# Patient Record
Sex: FEMALE | Race: WHITE | NOT HISPANIC OR LATINO | Employment: FULL TIME | URBAN - METROPOLITAN AREA
[De-identification: names, ages, dates, MRNs, and addresses within clinical notes are randomized per-mention and may not be internally consistent; named-entity substitution may affect disease eponyms.]

---

## 2022-12-27 ENCOUNTER — OFFICE VISIT (OUTPATIENT)
Dept: INTERNAL MEDICINE | Facility: CLINIC | Age: 37
End: 2022-12-27
Payer: COMMERCIAL

## 2022-12-27 VITALS
HEIGHT: 63 IN | SYSTOLIC BLOOD PRESSURE: 118 MMHG | RESPIRATION RATE: 20 BRPM | TEMPERATURE: 98.2 F | BODY MASS INDEX: 25.71 KG/M2 | DIASTOLIC BLOOD PRESSURE: 86 MMHG | HEART RATE: 74 BPM | WEIGHT: 145.1 LBS | OXYGEN SATURATION: 99 %

## 2022-12-27 DIAGNOSIS — N92.0 MENORRHAGIA WITH REGULAR CYCLE: ICD-10-CM

## 2022-12-27 DIAGNOSIS — K21.00 GASTROESOPHAGEAL REFLUX DISEASE WITH ESOPHAGITIS WITHOUT HEMORRHAGE: ICD-10-CM

## 2022-12-27 DIAGNOSIS — Z23 INFLUENZA VACCINATION ADMINISTERED AT CURRENT VISIT: ICD-10-CM

## 2022-12-27 DIAGNOSIS — Z13.220 SCREENING FOR HYPERLIPIDEMIA: ICD-10-CM

## 2022-12-27 DIAGNOSIS — E55.9 VITAMIN D DEFICIENCY: ICD-10-CM

## 2022-12-27 DIAGNOSIS — Z00.00 ROUTINE GENERAL MEDICAL EXAMINATION AT A HEALTH CARE FACILITY: Primary | ICD-10-CM

## 2022-12-27 DIAGNOSIS — Z23 COVID-19 VACCINE ADMINISTERED: ICD-10-CM

## 2022-12-27 LAB
ALBUMIN SERPL BCG-MCNC: 4.2 G/DL (ref 3.5–5.2)
ALP SERPL-CCNC: 109 U/L (ref 35–104)
ALT SERPL W P-5'-P-CCNC: 12 U/L (ref 10–35)
ANION GAP SERPL CALCULATED.3IONS-SCNC: 12 MMOL/L (ref 7–15)
AST SERPL W P-5'-P-CCNC: 28 U/L (ref 10–35)
BASOPHILS # BLD AUTO: 0 10E3/UL (ref 0–0.2)
BASOPHILS NFR BLD AUTO: 0 %
BILIRUB SERPL-MCNC: 1.2 MG/DL
BUN SERPL-MCNC: 9.9 MG/DL (ref 6–20)
CALCIUM SERPL-MCNC: 9.7 MG/DL (ref 8.6–10)
CHLORIDE SERPL-SCNC: 105 MMOL/L (ref 98–107)
CHOLEST SERPL-MCNC: 173 MG/DL
CREAT SERPL-MCNC: 0.68 MG/DL (ref 0.51–0.95)
DEPRECATED HCO3 PLAS-SCNC: 23 MMOL/L (ref 22–29)
EOSINOPHIL # BLD AUTO: 0.1 10E3/UL (ref 0–0.7)
EOSINOPHIL NFR BLD AUTO: 1 %
ERYTHROCYTE [DISTWIDTH] IN BLOOD BY AUTOMATED COUNT: 12.6 % (ref 10–15)
FERRITIN SERPL-MCNC: 44 NG/ML (ref 6–175)
GFR SERPL CREATININE-BSD FRML MDRD: >90 ML/MIN/1.73M2
GLUCOSE SERPL-MCNC: 89 MG/DL (ref 70–99)
HCT VFR BLD AUTO: 39.1 % (ref 35–47)
HDLC SERPL-MCNC: 55 MG/DL
HGB BLD-MCNC: 13.5 G/DL (ref 11.7–15.7)
IMM GRANULOCYTES # BLD: 0 10E3/UL
IMM GRANULOCYTES NFR BLD: 0 %
IRON BINDING CAPACITY (ROCHE): 314 UG/DL (ref 240–430)
IRON SATN MFR SERPL: 35 % (ref 15–46)
IRON SERPL-MCNC: 109 UG/DL (ref 37–145)
LDLC SERPL CALC-MCNC: 101 MG/DL
LYMPHOCYTES # BLD AUTO: 1.8 10E3/UL (ref 0.8–5.3)
LYMPHOCYTES NFR BLD AUTO: 26 %
MCH RBC QN AUTO: 29.7 PG (ref 26.5–33)
MCHC RBC AUTO-ENTMCNC: 34.5 G/DL (ref 31.5–36.5)
MCV RBC AUTO: 86 FL (ref 78–100)
MONOCYTES # BLD AUTO: 0.6 10E3/UL (ref 0–1.3)
MONOCYTES NFR BLD AUTO: 8 %
NEUTROPHILS # BLD AUTO: 4.4 10E3/UL (ref 1.6–8.3)
NEUTROPHILS NFR BLD AUTO: 65 %
NONHDLC SERPL-MCNC: 118 MG/DL
PLATELET # BLD AUTO: 226 10E3/UL (ref 150–450)
POTASSIUM SERPL-SCNC: 3.7 MMOL/L (ref 3.4–5.3)
PROT SERPL-MCNC: 7.4 G/DL (ref 6.4–8.3)
RBC # BLD AUTO: 4.55 10E6/UL (ref 3.8–5.2)
SODIUM SERPL-SCNC: 140 MMOL/L (ref 136–145)
TRIGL SERPL-MCNC: 86 MG/DL
WBC # BLD AUTO: 6.9 10E3/UL (ref 4–11)

## 2022-12-27 PROCEDURE — 80061 LIPID PANEL: CPT | Performed by: INTERNAL MEDICINE

## 2022-12-27 PROCEDURE — 90471 IMMUNIZATION ADMIN: CPT | Performed by: INTERNAL MEDICINE

## 2022-12-27 PROCEDURE — 85025 COMPLETE CBC W/AUTO DIFF WBC: CPT | Performed by: INTERNAL MEDICINE

## 2022-12-27 PROCEDURE — 99385 PREV VISIT NEW AGE 18-39: CPT | Mod: 25 | Performed by: INTERNAL MEDICINE

## 2022-12-27 PROCEDURE — 90686 IIV4 VACC NO PRSV 0.5 ML IM: CPT | Performed by: INTERNAL MEDICINE

## 2022-12-27 PROCEDURE — 82728 ASSAY OF FERRITIN: CPT | Performed by: INTERNAL MEDICINE

## 2022-12-27 PROCEDURE — 91312 COVID-19 VACCINE BIVALENT BOOSTER 12+ (PFIZER): CPT | Performed by: INTERNAL MEDICINE

## 2022-12-27 PROCEDURE — 83550 IRON BINDING TEST: CPT | Performed by: INTERNAL MEDICINE

## 2022-12-27 PROCEDURE — 82306 VITAMIN D 25 HYDROXY: CPT | Performed by: INTERNAL MEDICINE

## 2022-12-27 PROCEDURE — 99213 OFFICE O/P EST LOW 20 MIN: CPT | Mod: 25 | Performed by: INTERNAL MEDICINE

## 2022-12-27 PROCEDURE — 80053 COMPREHEN METABOLIC PANEL: CPT | Performed by: INTERNAL MEDICINE

## 2022-12-27 PROCEDURE — 0124A COVID-19 VACCINE BIVALENT BOOSTER 12+ (PFIZER): CPT | Performed by: INTERNAL MEDICINE

## 2022-12-27 PROCEDURE — 36415 COLL VENOUS BLD VENIPUNCTURE: CPT | Performed by: INTERNAL MEDICINE

## 2022-12-27 PROCEDURE — 83540 ASSAY OF IRON: CPT | Performed by: INTERNAL MEDICINE

## 2022-12-27 RX ORDER — FAMOTIDINE 20 MG/1
20 TABLET, FILM COATED ORAL DAILY PRN
Qty: 30 TABLET | Refills: 11 | Status: SHIPPED | OUTPATIENT
Start: 2022-12-27

## 2022-12-27 ASSESSMENT — PAIN SCALES - GENERAL: PAINLEVEL: NO PAIN (0)

## 2022-12-27 NOTE — LETTER
January 5, 2023      [employer]  [address] [facility] [address]        Dear ,    We are writing to inform you of your test results.    Your vitamin D is slightly low. I recommend taking a supplement of vitamin D3 5000 international units daily, you can buy this over the counter at any pharmacy.       Your cholesterol levels look good. Your kidney function, electrolytes and liver function are all normal. The very slight elevation in alkaline phosphatase is nothing to worry about, we will recheck this the next time I see you just to monitor.       Your iron levels and blood counts are normal. So your heavy periods have not made you anemic, which is good news.       Resulted Orders   Comprehensive metabolic panel   Result Value Ref Range    Sodium 140 136 - 145 mmol/L    Potassium 3.7 3.4 - 5.3 mmol/L    Chloride 105 98 - 107 mmol/L    Carbon Dioxide (CO2) 23 22 - 29 mmol/L    Anion Gap 12 7 - 15 mmol/L    Urea Nitrogen 9.9 6.0 - 20.0 mg/dL    Creatinine 0.68 0.51 - 0.95 mg/dL    Calcium 9.7 8.6 - 10.0 mg/dL    Glucose 89 70 - 99 mg/dL    Alkaline Phosphatase 109 (H) 35 - 104 U/L    AST 28 10 - 35 U/L    ALT 12 10 - 35 U/L    Protein Total 7.4 6.4 - 8.3 g/dL    Albumin 4.2 3.5 - 5.2 g/dL    Bilirubin Total 1.2 <=1.2 mg/dL    GFR Estimate >90 >60 mL/min/1.73m2      Comment:      Effective December 21, 2021 eGFRcr in adults is calculated using the 2021 CKD-EPI creatinine equation which includes age and gender (Karen et al., NEJ, DOI: 10.1056/NEUPua7341258)   Iron and iron binding capacity   Result Value Ref Range    Iron 109 37 - 145 ug/dL    Iron Binding Capacity 314 240 - 430 ug/dL    Iron Sat Index 35 15 - 46 %   Ferritin   Result Value Ref Range    Ferritin 44 6 - 175 ng/mL   CBC with platelets and differential   Result Value Ref Range    WBC Count 6.9 4.0 - 11.0 10e3/uL    RBC Count 4.55 3.80 - 5.20 10e6/uL    Hemoglobin 13.5 11.7 - 15.7 g/dL    Hematocrit 39.1 35.0 - 47.0 %    MCV 86 78 - 100 fL     MCH 29.7 26.5 - 33.0 pg    MCHC 34.5 31.5 - 36.5 g/dL    RDW 12.6 10.0 - 15.0 %    Platelet Count 226 150 - 450 10e3/uL    % Neutrophils 65 %    % Lymphocytes 26 %    % Monocytes 8 %    % Eosinophils 1 %    % Basophils 0 %    % Immature Granulocytes 0 %    Absolute Neutrophils 4.4 1.6 - 8.3 10e3/uL    Absolute Lymphocytes 1.8 0.8 - 5.3 10e3/uL    Absolute Monocytes 0.6 0.0 - 1.3 10e3/uL    Absolute Eosinophils 0.1 0.0 - 0.7 10e3/uL    Absolute Basophils 0.0 0.0 - 0.2 10e3/uL    Absolute Immature Granulocytes 0.0 <=0.4 10e3/uL       If you have any questions or concerns, please call the clinic at the number listed above.       Sincerely,      Angi Ordaz MD

## 2022-12-27 NOTE — ASSESSMENT & PLAN NOTE
Well controlled with pepcid 20 mg PRN. Refill provided today. Likely improved recently in s/o weight loss.

## 2022-12-27 NOTE — ASSESSMENT & PLAN NOTE
Patient is doing well today. Weight is down recently due to lifestyle changes of improving diet and working days instead of night, patient is happy with this. BP perfect today. Will check routine labs. She is due for Pap, will come back in 2 months to have this done.

## 2022-12-27 NOTE — ASSESSMENT & PLAN NOTE
Vitamin D low in the past, per review of outside records was 8 in 2021 and 12.6 (4/2022). Not currently taking any supplements.   - Recheck vitamin D, recommendation for replacement pending level

## 2022-12-27 NOTE — PROGRESS NOTES
Assessment & Plan   Problem List Items Addressed This Visit        Digestive    Gastroesophageal reflux disease with esophagitis without hemorrhage     Well controlled with pepcid 20 mg PRN. Refill provided today. Likely improved recently in s/o weight loss.          Relevant Medications    famotidine (PEPCID) 20 MG tablet    Vitamin D deficiency     Vitamin D low in the past, per review of outside records was 8 in 2021 and 12.6 (4/2022). Not currently taking any supplements.   - Recheck vitamin D, recommendation for replacement pending level         Relevant Orders    Vitamin D Deficiency       Urinary    Menorrhagia with regular cycle     In last 2-3 months period has become heavier and associated with more cramping.   - Check CBC and iron studies  - Pelvic US to evaluate for structural cause  - If nothing structural found, consider OCP to regulate periods -> will discuss at follow up in 2 months  - Recommended PRN NSAIDs for cramping         Relevant Orders    CBC with platelets and differential (Completed)    Iron and iron binding capacity    Ferritin    US Pelvic Complete with Transvaginal       Other    Routine general medical examination at a health care facility - Primary     Patient is doing well today. Weight is down recently due to lifestyle changes of improving diet and working days instead of night, patient is happy with this. BP perfect today. Will check routine labs. She is due for Pap, will come back in 2 months to have this done.          Relevant Orders    CBC with platelets and differential (Completed)    Comprehensive metabolic panel    Vitamin D Deficiency   Other Visit Diagnoses     Screening for hyperlipidemia        Relevant Orders    Lipid Profile (Chol, Trig, HDL, LDL calc)    COVID-19 vaccine administered        Relevant Orders    COVID-19 VACCINE BIVALENT BOOSTER 12+ (PFIZER) (Completed)    Influenza vaccination administered at current visit        Relevant Orders    INFLUENZA VACCINE  "IM > 6 MONTHS VALENT IIV4 (AFLURIA/FLUZONE) (Completed)            BMI:   Estimated body mass index is 25.5 kg/m  as calculated from the following:    Height as of this encounter: 1.607 m (5' 3.25\").    Weight as of this encounter: 65.8 kg (145 lb 1.6 oz).   Weight management plan: Discussed healthy diet and exercise guidelines    Return in about 2 months (around 2/27/2023) for Routine preventive.    Angi Ordaz MD  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 Since moving here and working since August, used to be 160 lbs ), and Abnormal Bleeding Problem (Pt states very heavy, lots of cramping and abnormal sometimes it goes up to 10 or 15 days)    Moved in August from [place], [place].     Elevated BP: Told BP was borderline before, never on medication.     Weight Loss: Weight 145 lbs today, used to be 160 lbs. Noticed this happened when she moved here and started working day shift instead of night shift. Was eating \"too much\" there, here diet is better.     Vitamin D def: previously taking vitamin D3 1000 international units daily, out for now. Vitamin D3 12.6 (4/6/22).     GERD: Famotidine PRN, working well     HLD: Was told lipids were elevated previously. Last checked 4/6/22 - Tchol 194, , , HDL 49    Menorrhagia: Last 2-3 months, periods lasting 6-9 days with associated painful cramping. Taking advil, does help with the cramping. Previously periods only lasted 3-5 days.     HCM: Last Pap smear 2/2017 NILM and neg reflex HR HPV, due    Review of Systems   Constitutional, HEENT, cardiovascular, pulmonary, GI, , musculoskeletal, neuro, skin, endocrine and psych " "systems are negative, except as otherwise noted.      Objective    /86 (BP Location: Right arm, Patient Position: Sitting, Cuff Size: Adult Regular)   Pulse 74   Temp 98.2  F (36.8  C) (Oral)   Resp 20   Ht 1.607 m (5' 3.25\")   Wt 65.8 kg (145 lb 1.6 oz)   LMP 11/12/2022 (Within Days)   SpO2 99%   BMI 25.50 kg/m    Body mass index is 25.5 kg/m .  Physical Exam   GENERAL: healthy, alert and no distress  EYES: Eyes grossly normal to inspection, PERRL and conjunctivae and sclerae normal  HENT: nose and mouth without ulcers or lesions  NECK: no adenopathy, no asymmetry, masses, or scars and thyroid normal to palpation  RESP: lungs clear to auscultation - no rales, rhonchi or wheezes  CV: regular rate and rhythm, normal S1 S2, no S3 or S4, no murmur, click or rub, no peripheral edema and peripheral pulses strong  ABDOMEN: soft, nontender, no hepatosplenomegaly, no masses and bowel sounds normal  MS: no gross musculoskeletal defects noted, no edema  SKIN: no suspicious lesions or rashes  NEURO: Normal strength and tone, mentation intact and speech normal  PSYCH: mentation appears normal, affect normal/bright    Labs 4/6/22  - TSH 1.84  - TIBC 302, Iron 63, Tsat 21%, ferritin 55  - HCV negative  - WBC 6, Hgb 14.3, plt 247              Answers for HPI/ROS submitted by the patient on 12/27/2022  Do you check your blood pressure regularly outside of the clinic?: No  Are your blood pressures ever more than 140 on the top number (systolic) OR more than 90 on the bottom number (diastolic)? (For example, greater than 140/90): No  Are you following a low salt diet?: No      "

## 2022-12-27 NOTE — ASSESSMENT & PLAN NOTE
In last 2-3 months period has become heavier and associated with more cramping.   - Check CBC and iron studies  - Pelvic US to evaluate for structural cause  - If nothing structural found, consider OCP to regulate periods -> will discuss at follow up in 2 months  - Recommended PRN NSAIDs for cramping

## 2022-12-28 LAB — DEPRECATED CALCIDIOL+CALCIFEROL SERPL-MC: 17 UG/L (ref 20–75)

## 2022-12-30 NOTE — RESULT ENCOUNTER NOTE
Your vitamin D is slightly low. I recommend taking a supplement of vitamin D3 5000 international units daily, you can buy this over the counter at any pharmacy.     Your cholesterol levels look good. Your kidney function, electrolytes and liver function are all normal. The very slight elevation in alkaline phosphatase is nothing to worry about, we will recheck this the next time I see you just to monitor.     Your iron levels and blood counts are normal. So your heavy periods have not made you anemic, which is good news.

## 2023-01-19 ENCOUNTER — HOSPITAL ENCOUNTER (OUTPATIENT)
Dept: ULTRASOUND IMAGING | Facility: HOSPITAL | Age: 38
Discharge: HOME | End: 2023-01-19
Attending: INTERNAL MEDICINE | Admitting: INTERNAL MEDICINE
Payer: COMMERCIAL

## 2023-01-19 DIAGNOSIS — N92.0 MENORRHAGIA WITH REGULAR CYCLE: ICD-10-CM

## 2023-01-19 PROCEDURE — 76856 US EXAM PELVIC COMPLETE: CPT

## 2023-01-31 ENCOUNTER — VIRTUAL VISIT (OUTPATIENT)
Dept: INTERNAL MEDICINE | Facility: CLINIC | Age: 38
End: 2023-01-31
Payer: COMMERCIAL

## 2023-01-31 ENCOUNTER — MYC MEDICAL ADVICE (OUTPATIENT)
Dept: INTERNAL MEDICINE | Facility: CLINIC | Age: 38
End: 2023-01-31

## 2023-01-31 DIAGNOSIS — Z30.013 ENCOUNTER FOR INITIAL PRESCRIPTION OF INJECTABLE CONTRACEPTIVE: Primary | ICD-10-CM

## 2023-01-31 DIAGNOSIS — N92.0 MENORRHAGIA WITH REGULAR CYCLE: ICD-10-CM

## 2023-01-31 PROCEDURE — 99213 OFFICE O/P EST LOW 20 MIN: CPT | Mod: 95 | Performed by: INTERNAL MEDICINE

## 2023-01-31 RX ORDER — MEDROXYPROGESTERONE ACETATE 150 MG/ML
150 INJECTION, SUSPENSION INTRAMUSCULAR
Status: ACTIVE | OUTPATIENT
Start: 2023-02-01 | End: 2024-01-26

## 2023-01-31 NOTE — ASSESSMENT & PLAN NOTE
Patient presents to discuss hormonal birth control options to help manage her heavy periods.  Work-up for menorrhagia was benign.  Very reasonable to start hormonal birth control to help manage.  We discussed Depo, combined oral contraceptives and IUD.  Patient has done Depo before and feels comfortable with it.  She would like to get 1 Depo injection and then after her vacation to [place] will let me know if she would like to continue with Depo injections were changed to a birth control pill have IUD placed.   - Depo and urine HCG with RN visit in next few weeks

## 2023-01-31 NOTE — PROGRESS NOTES
Sheba is a 37 year old who is being evaluated via a billable video visit.      How would you like to obtain your AVS? MyChart  If the video visit is dropped, the invitation should be resent by: Text to cell phone: [phone]  Will anyone else be joining your video visit? No    Assessment & Plan   Problem List Items Addressed This Visit        Urinary    Menorrhagia with regular cycle     Heavy periods are stable. CBC and iron studies normal. Pelvic US showed non-specific endometrial thickening.   - Will start hormonal birth control to manage heavy periods            Other    Encounter for initial prescription of injectable contraceptive - Primary     Patient presents to discuss hormonal birth control options to help manage her heavy periods.  Work-up for menorrhagia was benign.  Very reasonable to start hormonal birth control to help manage.  We discussed Depo, combined oral contraceptives and IUD.  Patient has done Depo before and feels comfortable with it.  She would like to get 1 Depo injection and then after her vacation to [place] will let me know if she would like to continue with Depo injections were changed to a birth control pill have IUD placed.   - Depo and urine HCG with RN visit in next few weeks         Relevant Medications    medroxyPROGESTERone (DEPO-PROVERA) injection 150 mg (Start on 2/1/2023 12:00 AM)    Other Relevant Orders    HCG qualitative urine        Return in about 6 months (around 7/31/2023).    Angi Ordaz MD  [facility]    Catarino Scruggs is a 37 year old, presenting for the following health issues:  Consult (Discuss birth control)    At her last visit, we discussed heavy periods.  Lab work-up was fairly unremarkable and she had a pelvic ultrasound on 1/19 that showed mild nonspecific endometrial thickening.  She presents today to discuss birth control options to help with her heavy periods. Previously did depo shots, last shots 6-7/2022. Worked  "well for contraception. Did have some irregular bleeding on depo. Going to [place] in March and does not want to start a new birth control pill prior to this trip.  She worries about timing of taking the medication with the time change and remembering to take a daily medication while there.    We discussed options of restarting Depo, oral contraceptive pill with combined estrogen and progesterone or progesterone containing IUD.    Review of Systems   Constitutional, HEENT, cardiovascular, pulmonary, gi and gu systems are negative, except as otherwise noted.      Objective    Vitals - Patient Reported  Weight (Patient Reported): 64.9 kg (143 lb)  Height (Patient Reported): 160 cm (5' 3\")  BMI (Based on Pt Reported Ht/Wt): 25.33    Physical Exam   GENERAL: Healthy, alert and no distress  EYES: Eyes grossly normal to inspection.  No discharge or erythema, or obvious scleral/conjunctival abnormalities.  RESP: No audible wheeze, cough, or visible cyanosis.  No visible retractions or increased work of breathing.    SKIN: Visible skin clear. No significant rash, abnormal pigmentation or lesions.  NEURO: Cranial nerves grossly intact.  Mentation and speech appropriate for age.  PSYCH: Mentation appears normal, affect normal/bright, judgement and insight intact, normal speech and appearance well-groomed.    Office Visit on 12/27/2022   Component Date Value Ref Range Status     Cholesterol 12/27/2022 173  <200 mg/dL Final     Triglycerides 12/27/2022 86  <150 mg/dL Final     Direct Measure HDL 12/27/2022 55  >=50 mg/dL Final     LDL Cholesterol Calculated 12/27/2022 101 (H)  <=100 mg/dL Final     Non HDL Cholesterol 12/27/2022 118  <130 mg/dL Final     Sodium 12/27/2022 140  136 - 145 mmol/L Final     Potassium 12/27/2022 3.7  3.4 - 5.3 mmol/L Final     Chloride 12/27/2022 105  98 - 107 mmol/L Final     Carbon Dioxide (CO2) 12/27/2022 23  22 - 29 mmol/L Final     Anion Gap 12/27/2022 12  7 - 15 mmol/L Final     Urea Nitrogen " 12/27/2022 9.9  6.0 - 20.0 mg/dL Final     Creatinine 12/27/2022 0.68  0.51 - 0.95 mg/dL Final     Calcium 12/27/2022 9.7  8.6 - 10.0 mg/dL Final     Glucose 12/27/2022 89  70 - 99 mg/dL Final     Alkaline Phosphatase 12/27/2022 109 (H)  35 - 104 U/L Final     AST 12/27/2022 28  10 - 35 U/L Final     ALT 12/27/2022 12  10 - 35 U/L Final     Protein Total 12/27/2022 7.4  6.4 - 8.3 g/dL Final     Albumin 12/27/2022 4.2  3.5 - 5.2 g/dL Final     Bilirubin Total 12/27/2022 1.2  <=1.2 mg/dL Final     GFR Estimate 12/27/2022 >90  >60 mL/min/1.73m2 Final    Effective December 21, 2021 eGFRcr in adults is calculated using the 2021 CKD-EPI creatinine equation which includes age and gender (Karen et al., NEJ, DOI: 10.1056/[id number])     Vitamin D, Total (25-Hydroxy) 12/27/2022 17 (L)  20 - 75 ug/L Final     Iron 12/27/2022 109  37 - 145 ug/dL Final     Iron Binding Capacity 12/27/2022 314  240 - 430 ug/dL Final     Iron Sat Index 12/27/2022 35  15 - 46 % Final     Ferritin 12/27/2022 44  6 - 175 ng/mL Final     WBC Count 12/27/2022 6.9  4.0 - 11.0 10e3/uL Final     RBC Count 12/27/2022 4.55  3.80 - 5.20 10e6/uL Final     Hemoglobin 12/27/2022 13.5  11.7 - 15.7 g/dL Final     Hematocrit 12/27/2022 39.1  35.0 - 47.0 % Final     MCV 12/27/2022 86  78 - 100 fL Final     MCH 12/27/2022 29.7  26.5 - 33.0 pg Final     MCHC 12/27/2022 34.5  31.5 - 36.5 g/dL Final     RDW 12/27/2022 12.6  10.0 - 15.0 % Final     Platelet Count 12/27/2022 226  150 - 450 10e3/uL Final     % Neutrophils 12/27/2022 65  % Final     % Lymphocytes 12/27/2022 26  % Final     % Monocytes 12/27/2022 8  % Final     % Eosinophils 12/27/2022 1  % Final     % Basophils 12/27/2022 0  % Final     % Immature Granulocytes 12/27/2022 0  % Final     Absolute Neutrophils 12/27/2022 4.4  1.6 - 8.3 10e3/uL Final     Absolute Lymphocytes 12/27/2022 1.8  0.8 - 5.3 10e3/uL Final     Absolute Monocytes 12/27/2022 0.6  0.0 - 1.3 10e3/uL Final     Absolute Eosinophils  12/27/2022 0.1  0.0 - 0.7 10e3/uL Final     Absolute Basophils 12/27/2022 0.0  0.0 - 0.2 10e3/uL Final     Absolute Immature Granulocytes 12/27/2022 0.0  <=0.4 10e3/uL Final             Video-Visit Details    Type of service:  Video Visit   Video Start Time: 4:33  Video End Time:5:44    Originating Location (pt. Location): Home  Distant Location (provider location):  On-site  Platform used for Video Visit: Arash

## 2023-01-31 NOTE — ASSESSMENT & PLAN NOTE
Heavy periods are stable. CBC and iron studies normal. Pelvic US showed non-specific endometrial thickening.   - Will start hormonal birth control to manage heavy periods

## 2023-02-06 ENCOUNTER — LAB (OUTPATIENT)
Dept: LAB | Facility: CLINIC | Age: 38
End: 2023-02-06
Payer: COMMERCIAL

## 2023-02-06 ENCOUNTER — ALLIED HEALTH/NURSE VISIT (OUTPATIENT)
Dept: FAMILY MEDICINE | Facility: CLINIC | Age: 38
End: 2023-02-06
Payer: COMMERCIAL

## 2023-02-06 DIAGNOSIS — Z30.013 ENCOUNTER FOR INITIAL PRESCRIPTION OF INJECTABLE CONTRACEPTIVE: Primary | ICD-10-CM

## 2023-02-06 DIAGNOSIS — Z30.013 ENCOUNTER FOR INITIAL PRESCRIPTION OF INJECTABLE CONTRACEPTIVE: ICD-10-CM

## 2023-02-06 LAB — HCG UR QL: NEGATIVE

## 2023-02-06 PROCEDURE — 99207 PR NO CHARGE NURSE ONLY: CPT

## 2023-02-06 PROCEDURE — 81025 URINE PREGNANCY TEST: CPT

## 2023-02-06 PROCEDURE — 96372 THER/PROPH/DIAG INJ SC/IM: CPT | Performed by: INTERNAL MEDICINE

## 2023-02-06 RX ADMIN — MEDROXYPROGESTERONE ACETATE 150 MG: 150 INJECTION, SUSPENSION INTRAMUSCULAR at 15:29

## 2023-06-28 ENCOUNTER — LAB (OUTPATIENT)
Dept: LAB | Facility: CLINIC | Age: 38
End: 2023-06-28
Payer: COMMERCIAL

## 2023-06-28 DIAGNOSIS — Z30.013 ENCOUNTER FOR INITIAL PRESCRIPTION OF INJECTABLE CONTRACEPTIVE: ICD-10-CM

## 2023-06-28 LAB — HCG UR QL: NEGATIVE

## 2023-06-28 PROCEDURE — 81025 URINE PREGNANCY TEST: CPT

## 2023-06-29 ENCOUNTER — ALLIED HEALTH/NURSE VISIT (OUTPATIENT)
Dept: FAMILY MEDICINE | Facility: CLINIC | Age: 38
End: 2023-06-29
Payer: COMMERCIAL

## 2023-06-29 DIAGNOSIS — Z23 ENCOUNTER FOR IMMUNIZATION: Primary | ICD-10-CM

## 2023-06-29 PROCEDURE — 96372 THER/PROPH/DIAG INJ SC/IM: CPT | Performed by: INTERNAL MEDICINE

## 2023-06-29 PROCEDURE — 99207 PR NO CHARGE NURSE ONLY: CPT

## 2023-06-29 RX ADMIN — MEDROXYPROGESTERONE ACETATE 150 MG: 150 INJECTION, SUSPENSION INTRAMUSCULAR at 16:15

## 2023-06-29 NOTE — PROGRESS NOTES
Clinic Administered Medication Documentation      Depo Provera Documentation    Depo-Provera Standing Order inclusion/exclusion criteria reviewed.     Is this the initial or subsequent dose of Depo Provera? Subsequent dose - patient is not within the acceptable window of time (11-15 weeks) for subsequent injection. Pregnancy test is indicated. Pregnancy test result: negative       Patient meets: inclusion criteria     Is there an active order (written within the past 365 days, with administrations remaining, not ) in the chart? Yes.     Prior to injection, verified patient identity using patient's name and date of birth. Medication was administered. Please see MAR and medication order for additional information.     Vial/Syringe: Single dose vial. Was entire vial of medication used? Yes    Patient instructed to remain in clinic for 15 minutes and report any adverse reaction to staff immediately.  NEXT INJECTION DUE: 23 - 10/12/23    Verified that the patient has refills remaining in their prescription.

## 2023-09-25 ENCOUNTER — ALLIED HEALTH/NURSE VISIT (OUTPATIENT)
Dept: FAMILY MEDICINE | Facility: CLINIC | Age: 38
End: 2023-09-25

## 2023-09-25 DIAGNOSIS — Z30.42 ENCOUNTER FOR DEPO-PROVERA CONTRACEPTION: Primary | ICD-10-CM

## 2023-09-25 PROCEDURE — 99207 PR NO CHARGE NURSE ONLY: CPT

## 2023-09-25 PROCEDURE — 96372 THER/PROPH/DIAG INJ SC/IM: CPT | Performed by: INTERNAL MEDICINE

## 2023-09-25 RX ADMIN — MEDROXYPROGESTERONE ACETATE 150 MG: 150 INJECTION, SUSPENSION INTRAMUSCULAR at 15:06

## 2023-11-13 ENCOUNTER — OFFICE VISIT (OUTPATIENT)
Dept: FAMILY MEDICINE | Facility: CLINIC | Age: 38
End: 2023-11-13
Payer: COMMERCIAL

## 2023-11-13 VITALS
TEMPERATURE: 99.3 F | BODY MASS INDEX: 24.25 KG/M2 | WEIGHT: 138 LBS | DIASTOLIC BLOOD PRESSURE: 104 MMHG | HEART RATE: 92 BPM | SYSTOLIC BLOOD PRESSURE: 159 MMHG | RESPIRATION RATE: 20 BRPM | OXYGEN SATURATION: 100 %

## 2023-11-13 DIAGNOSIS — J10.1 INFLUENZA B: ICD-10-CM

## 2023-11-13 DIAGNOSIS — R82.90 BAD ODOR OF URINE: ICD-10-CM

## 2023-11-13 DIAGNOSIS — R39.9 UTI SYMPTOMS: Primary | ICD-10-CM

## 2023-11-13 DIAGNOSIS — R10.9 ABDOMINAL DISCOMFORT: ICD-10-CM

## 2023-11-13 DIAGNOSIS — M79.10 MUSCLE ACHE: ICD-10-CM

## 2023-11-13 LAB
ALBUMIN UR-MCNC: NEGATIVE MG/DL
APPEARANCE UR: CLEAR
BACTERIA #/AREA URNS HPF: ABNORMAL /HPF
BILIRUB UR QL STRIP: NEGATIVE
C TRACH DNA SPEC QL NAA+PROBE: NEGATIVE
CLUE CELLS: ABNORMAL
COLOR UR AUTO: YELLOW
FLUAV AG SPEC QL IA: NEGATIVE
FLUBV AG SPEC QL IA: POSITIVE
GLUCOSE UR STRIP-MCNC: NEGATIVE MG/DL
HCG UR QL: NEGATIVE
HGB UR QL STRIP: ABNORMAL
KETONES UR STRIP-MCNC: NEGATIVE MG/DL
LEUKOCYTE ESTERASE UR QL STRIP: ABNORMAL
MUCOUS THREADS #/AREA URNS LPF: PRESENT /LPF
N GONORRHOEA DNA SPEC QL NAA+PROBE: NEGATIVE
NITRATE UR QL: POSITIVE
PH UR STRIP: 7 [PH] (ref 5–8)
RBC #/AREA URNS AUTO: ABNORMAL /HPF
SP GR UR STRIP: 1.02 (ref 1–1.03)
SQUAMOUS #/AREA URNS AUTO: ABNORMAL /LPF
TRICHOMONAS, WET PREP: ABNORMAL
UROBILINOGEN UR STRIP-ACNC: 0.2 E.U./DL
WBC #/AREA URNS AUTO: ABNORMAL /HPF
WBC'S/HIGH POWER FIELD, WET PREP: ABNORMAL
YEAST, WET PREP: ABNORMAL

## 2023-11-13 PROCEDURE — 99214 OFFICE O/P EST MOD 30 MIN: CPT | Performed by: PHYSICIAN ASSISTANT

## 2023-11-13 PROCEDURE — 81025 URINE PREGNANCY TEST: CPT | Performed by: PHYSICIAN ASSISTANT

## 2023-11-13 PROCEDURE — 87186 SC STD MICRODIL/AGAR DIL: CPT | Mod: 59 | Performed by: PHYSICIAN ASSISTANT

## 2023-11-13 PROCEDURE — 87591 N.GONORRHOEAE DNA AMP PROB: CPT | Performed by: PHYSICIAN ASSISTANT

## 2023-11-13 PROCEDURE — 81001 URINALYSIS AUTO W/SCOPE: CPT | Performed by: PHYSICIAN ASSISTANT

## 2023-11-13 PROCEDURE — 87635 SARS-COV-2 COVID-19 AMP PRB: CPT | Performed by: PHYSICIAN ASSISTANT

## 2023-11-13 PROCEDURE — 87210 SMEAR WET MOUNT SALINE/INK: CPT | Performed by: PHYSICIAN ASSISTANT

## 2023-11-13 PROCEDURE — 87804 INFLUENZA ASSAY W/OPTIC: CPT | Performed by: PHYSICIAN ASSISTANT

## 2023-11-13 PROCEDURE — 87491 CHLMYD TRACH DNA AMP PROBE: CPT | Performed by: PHYSICIAN ASSISTANT

## 2023-11-13 PROCEDURE — 87086 URINE CULTURE/COLONY COUNT: CPT | Performed by: PHYSICIAN ASSISTANT

## 2023-11-13 RX ORDER — IBUPROFEN 200 MG
200 TABLET ORAL ONCE
Status: DISCONTINUED | OUTPATIENT
Start: 2023-11-13 | End: 2023-11-13

## 2023-11-13 RX ORDER — IBUPROFEN 200 MG
600 TABLET ORAL ONCE
Status: COMPLETED | OUTPATIENT
Start: 2023-11-13 | End: 2023-11-13

## 2023-11-13 RX ORDER — NITROFURANTOIN 25; 75 MG/1; MG/1
100 CAPSULE ORAL 2 TIMES DAILY
Qty: 10 CAPSULE | Refills: 0 | Status: SHIPPED | OUTPATIENT
Start: 2023-11-13 | End: 2023-11-18

## 2023-11-13 RX ORDER — OSELTAMIVIR PHOSPHATE 75 MG/1
75 CAPSULE ORAL 2 TIMES DAILY
Qty: 10 CAPSULE | Refills: 0 | Status: SHIPPED | OUTPATIENT
Start: 2023-11-13 | End: 2023-11-18

## 2023-11-13 RX ADMIN — Medication 600 MG: at 12:49

## 2023-11-13 NOTE — PROGRESS NOTES
Assessment & Plan       Abdominal discomfort  Pt with mild suprapubic pressure and malodorous urine without irritative voiding, and low grade fever, states it feels like previous uti other than no dysuria.   UA is not highly suggestive of uti but is nitrate positive, 6-10 WBC. Normal Wetprep and negative HCG.  Will cover with Macrobid awaiting remainder of the results including GC/Chlamydia.  Abdomen exam is benign and without CVAT.     - HCG qualitative urine  - NEISSERIA GONORRHOEA PCR  - CHLAMYDIA TRACHOMATIS PCR  - HCG qualitative urine    Bad odor of urine  As above.    Reassured of normal wet prep.   - Wet prep - Clinic Collect    Muscle ache  Moderate muscle aches and low grade fever, with positive influenza test.    Given tamiflu given recent onset of sx.    Follow-up prn.      - Symptomatic COVID-19 Virus (Coronavirus) by PCR Nose  - Influenza A & B Antigen - Clinic Collect  - ibuprofen (ADVIL/MOTRIN) tablet 600 mg        Influenza B   Push fluids, rest and ibuprofen or tylenol for comfort.    RTC for persistent or worsening sx.   At the end of the encounter, I discussed results, diagnosis, medications. Discussed red flags for immediate return to clinic/ER, as well as indications for follow up if no improvement. Patient understood and agreed to plan.   - oseltamivir (TAMIFLU) 75 MG capsule  Dispense: 10 capsule; Refill: 0         Hawa Linn PA-C  [facility]     Sheba is a 38 year old female who presents to clinic today for the following health issues:  Chief Complaint   Patient presents with    Back Pain     Arms and legs have been crampy. Lower abdominal pain. Foul odor with urination. Frequency. Feels feverish. Lower back pain. Sx 3 days ago. Last took ibuprofen at 4 am. Has been sweating. Has been having chills.      HPI  Pt presents to the clinic with concerns re: diffuse muscle aches, fatigue, chills x 2-3 days.    Lower abdomen discomfort/pressure like she  has had with previous uti other than no urinary frequency, urgency or hematuria.    Muscle aches.  Ibuprofen helpful    Feels warm but no fever documented.    [place] from [place] 2 [place]. No known exposures.     No constipation.  Low appititied.    No vaginal itching, irritation, change of discharge.    Darker urine.    Odor, to urine.    On Depo, no concern for pregancy.   No concern for STI.    LMP: spotting currently but on depo.          Review of Systems  Constitutional, HEENT, cardiovascular, pulmonary, gi and gu systems are negative, except as otherwise noted.      Objective    BP (!) 159/104 (BP Location: Right arm, Patient Position: Sitting, Cuff Size: Adult Regular)   Pulse 92   Temp 99.3  F (37.4  C) (Oral)   Resp 20   Wt 62.6 kg (138 lb)   SpO2 100%   BMI 24.25 kg/m    Physical Exam   Pt is in no acute distress and appears well  Ears patent B:  TM s intact, non-injected. All land marks easily visibile    Nasal mucosa is non-edematous, no discharge.    Pharynx: non erythematous, tonsils non hypertrophied, No exudate   Neck supple: no adenopathy  Lungs: CTA  Heart: RRR, no murmur, no thrills or heaves   Ext: no edema  Skin: no rashes    Abdomen: BS Active, soft, nondistended, nontender to light or deep palpation other than mild suprapubic pressure. No rebound or peritoneal signs. No masses or hsm.     Results for orders placed or performed in visit on 11/13/23   UA Macroscopic with reflex to Microscopic and Culture - Clinic Collect     Status: Abnormal    Specimen: Urine, Clean Catch   Result Value Ref Range    Color Urine Yellow Colorless, Straw, Light Yellow, Yellow    Appearance Urine Clear Clear    Glucose Urine Negative Negative mg/dL    Bilirubin Urine Negative Negative    Ketones Urine Negative Negative mg/dL    Specific Gravity Urine 1.020 1.005 - 1.030    Blood Urine Moderate (A) Negative    pH Urine 7.0 5.0 - 8.0    Protein Albumin Urine Negative Negative mg/dL    Urobilinogen Urine 0.2  0.2, 1.0 E.U./dL    Nitrite Urine Positive (A) Negative    Leukocyte Esterase Urine Trace (A) Negative   Urine Microscopic Exam     Status: Abnormal   Result Value Ref Range    Bacteria Urine Many (A) None Seen /HPF    RBC Urine 10-25 (A) 0-2 /HPF /HPF    WBC Urine 5-10 (A) 0-5 /HPF /HPF    Squamous Epithelials Urine Few (A) None Seen /LPF    Mucus Urine Present (A) None Seen /LPF   HCG qualitative urine     Status: Normal   Result Value Ref Range    hCG Urine Qualitative Negative Negative   Influenza A & B Antigen - Clinic Collect     Status: Abnormal    Specimen: Nose; Swab   Result Value Ref Range    Influenza A antigen Negative Negative    Influenza B antigen Positive (A) Negative    Narrative    Test results must be correlated with clinical data. If necessary, results should be confirmed by a molecular assay or viral culture.   Wet prep - Clinic Collect     Status: Abnormal    Specimen: Vagina; Swab   Result Value Ref Range    Trichomonas Absent Absent    Yeast Absent Absent    Clue Cells Absent Absent    WBCs/high power field 3+ (A) None

## 2023-11-14 LAB — SARS-COV-2 RNA RESP QL NAA+PROBE: NEGATIVE

## 2023-11-17 LAB
BACTERIA UR CULT: ABNORMAL
BACTERIA UR CULT: ABNORMAL

## 2023-11-27 ENCOUNTER — PATIENT OUTREACH (OUTPATIENT)
Dept: CARE COORDINATION | Facility: CLINIC | Age: 38
End: 2023-11-27

## 2023-12-11 ENCOUNTER — PATIENT OUTREACH (OUTPATIENT)
Dept: CARE COORDINATION | Facility: CLINIC | Age: 38
End: 2023-12-11

## 2024-03-10 ENCOUNTER — HEALTH MAINTENANCE LETTER (OUTPATIENT)
Age: 39
End: 2024-03-10

## 2024-04-04 ENCOUNTER — OFFICE VISIT (OUTPATIENT)
Dept: FAMILY MEDICINE | Facility: CLINIC | Age: 39
End: 2024-04-04
Payer: COMMERCIAL

## 2024-04-04 VITALS
DIASTOLIC BLOOD PRESSURE: 92 MMHG | BODY MASS INDEX: 26.22 KG/M2 | WEIGHT: 148 LBS | HEART RATE: 72 BPM | SYSTOLIC BLOOD PRESSURE: 160 MMHG | HEIGHT: 63 IN | OXYGEN SATURATION: 99 % | RESPIRATION RATE: 16 BRPM | TEMPERATURE: 97.3 F

## 2024-04-04 DIAGNOSIS — Z12.4 CERVICAL CANCER SCREENING: ICD-10-CM

## 2024-04-04 DIAGNOSIS — R03.0 ELEVATED BLOOD PRESSURE READING WITHOUT DIAGNOSIS OF HYPERTENSION: Primary | ICD-10-CM

## 2024-04-04 PROCEDURE — 99203 OFFICE O/P NEW LOW 30 MIN: CPT | Performed by: FAMILY MEDICINE

## 2024-04-04 NOTE — PROGRESS NOTES
"  Assessment & Plan     Elevated blood pressure reading without diagnosis of hypertension  Management discussed with patient included healthy lifestyle changes, weight loss program, cut salt intake,DASH diet etc, we've also discussed pharmacological treatment, patient declined at this time and prefer to work on lifestyle changes, check her blood pressure regularly, keep a log, follow-up with her PCP at the [facility].    Cervical cancer screening  She is planning to schedule it with her new PCP at the [facility].    Review of external notes as documented elsewhere in note  30 minutes spent by me on the date of the encounter doing chart review, review of outside records, review of test results, interpretation of tests, patient visit, and documentation       BMI  Estimated body mass index is 25.9 kg/m  as calculated from the following:    Height as of this encounter: 1.61 m (5' 3.39\").    Weight as of this encounter: 67.1 kg (148 lb).   Weight management plan: Discussed healthy diet and exercise guidelines      Work on weight loss  Regular exercise    Catarino Scruggs is a 38 year old, presenting for the following health issues:  Dizziness (For few months , will schedule physical with primary clinic ) and Hypertension (Has not seen a provider for the last year , have notice B/P high)      4/4/2024     3:48 PM   Additional Questions   Roomed by mike     History of Present Illness       Hypertension: She presents for follow up of hypertension.  She does not check blood pressure  regularly outside of the clinic. Outside blood pressures have been over 140/90. She does not follow a low salt diet.     Reason for visit:  High blood pressure  Symptom onset:  More than a month  Symptom intensity:  Moderate  Symptom progression:  Worsening  Had these symptoms before:  Yes    She eats 0-1 servings of fruits and vegetables daily.She consumes 1 sweetened beverage(s) daily.She exercises with enough effort to " "increase her heart rate 9 or less minutes per day.  She exercises with enough effort to increase her heart rate 3 or less days per week.   She is taking medications regularly.       Blood pressure has been running high lately at home, systolic usually around 146, has had dizziness and occasional, used to be high couple of years ago but she was able to manage that with lifestyle changes, weight loss program.  Her weight went up, previously at 133 pounds, currently 148 pounds, denies any chest pain shortness of breath or dizziness, she was understanding those, she is a non-smoker, she has 2 kids.      Review of Systems  Constitutional, HEENT, cardiovascular, pulmonary, gi and gu systems are negative, except as otherwise noted.      Objective    BP (!) 151/94 (BP Location: Left arm, Patient Position: Sitting, Cuff Size: Adult Regular)   Pulse 72   Temp 97.3  F (36.3  C) (Temporal)   Resp 16   Ht 1.61 m (5' 3.39\")   Wt 67.1 kg (148 lb)   LMP 04/04/2024   SpO2 99%   BMI 25.90 kg/m    Body mass index is 25.9 kg/m .  Physical Exam   GENERAL: alert and no distress  NECK: no adenopathy, no asymmetry, masses, or scars  RESP: lungs clear to auscultation - no rales, rhonchi or wheezes  CV: regular rate and rhythm, normal S1 S2, no S3 or S4, no murmur, click or rub, no peripheral edema  ABDOMEN: soft, nontender, no hepatosplenomegaly, no masses and bowel sounds normal  MS: no gross musculoskeletal defects noted, no edema          Prior to immunization administration, verified patients identity using patient s name and date of birth. Please see Immunization Activity for additional information.     Screening Questionnaire for Adult Immunization    Are you sick today?   No   Do you have allergies to medications, food, a vaccine component or latex?   No   Have you ever had a serious reaction after receiving a vaccination?   No   Do you have a long-term health problem with heart, lung, kidney, or metabolic disease (e.g., " diabetes), asthma, a blood disorder, no spleen, complement component deficiency, a cochlear implant, or a spinal fluid leak?  Are you on long-term aspirin therapy?   No   Do you have cancer, leukemia, HIV/AIDS, or any other immune system problem?   No   Do you have a parent, brother, or sister with an immune system problem?   No   In the past 3 months, have you taken medications that affect  your immune system, such as prednisone, other steroids, or anticancer drugs; drugs for the treatment of rheumatoid arthritis, Crohn s disease, or psoriasis; or have you had radiation treatments?   No   Have you had a seizure, or a brain or other nervous system problem?   No   During the past year, have you received a transfusion of blood or blood    products, or been given immune (gamma) globulin or antiviral drug?   No   For women: Are you pregnant or is there a chance you could become       pregnant during the next month?   No   Have you received any vaccinations in the past 4 weeks?   No     Immunization questionnaire answers were all negative.      Patient instructed to remain in clinic for 15 minutes afterwards, and to report any adverse reactions.     Screening performed by Kortney Galvan MA on 4/4/2024 at 3:54 PM.       Signed Electronically by: Johnnie Handy MD

## 2024-06-24 ENCOUNTER — PATIENT OUTREACH (OUTPATIENT)
Dept: CARE COORDINATION | Facility: CLINIC | Age: 39
End: 2024-06-24
Payer: COMMERCIAL

## 2025-03-16 ENCOUNTER — HEALTH MAINTENANCE LETTER (OUTPATIENT)
Age: 40
End: 2025-03-16